# Patient Record
Sex: MALE | Race: WHITE | NOT HISPANIC OR LATINO | URBAN - METROPOLITAN AREA
[De-identification: names, ages, dates, MRNs, and addresses within clinical notes are randomized per-mention and may not be internally consistent; named-entity substitution may affect disease eponyms.]

---
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
## 2021-12-27 ENCOUNTER — HOSPITAL ENCOUNTER (OUTPATIENT)
Age: 21
Discharge: HOME | End: 2021-12-27
Payer: OTHER GOVERNMENT

## 2021-12-27 VITALS
WEIGHT: 140 LBS | BODY MASS INDEX: 22.5 KG/M2 | RESPIRATION RATE: 16 BRPM | TEMPERATURE: 98 F | OXYGEN SATURATION: 97 % | HEIGHT: 66 IN | SYSTOLIC BLOOD PRESSURE: 105 MMHG | HEART RATE: 55 BPM | DIASTOLIC BLOOD PRESSURE: 54 MMHG

## 2021-12-27 DIAGNOSIS — Z20.822 EXPOSURE TO COVID-19 VIRUS: Primary | ICD-10-CM

## 2021-12-27 PROCEDURE — 99203 OFFICE O/P NEW LOW 30 MIN: CPT | Performed by: NURSE PRACTITIONER

## 2021-12-28 NOTE — ED PROVIDER NOTES
Patient Seen in: [facility] [address] [place] [address]      History   Patient presents with:  Cough    Stated Complaint: Covid Exposure- Cough    Subjective:   31-year-old male presents IC with a cold exposure has complained of a nonproductive cough last couple days.   P Atraumatic. Extraocular motions are intact  NECK: Supple. No meningitic signs are noted. CHEST/LUNGS: Clear to auscultation. There is no respiratory distress noted. HEART/CARDIOVASCULAR: Regular. There is no tachycardia. SKIN: There is no rash.   Kemi Mccoy

## 2021-12-28 NOTE — ED INITIAL ASSESSMENT (HPI)
Patient states he had a fever Wed-Friday and cough since Friday. Was exposed to Covid on Christmas Eve and on Tuesday. Would like Covid testing.

## 2021-12-29 LAB — SARS-COV-2 RNA RESP QL NAA+PROBE: DETECTED
